# Patient Record
Sex: MALE | Race: WHITE | NOT HISPANIC OR LATINO | Employment: OTHER | URBAN - METROPOLITAN AREA
[De-identification: names, ages, dates, MRNs, and addresses within clinical notes are randomized per-mention and may not be internally consistent; named-entity substitution may affect disease eponyms.]

---

## 2017-02-17 ENCOUNTER — IMPORTED ENCOUNTER (OUTPATIENT)
Dept: URBAN - METROPOLITAN AREA CLINIC 43 | Facility: CLINIC | Age: 77
End: 2017-02-17

## 2017-02-17 PROBLEM — H35.363: Noted: 2017-02-17

## 2017-02-17 PROBLEM — H46.8: Noted: 2017-02-17

## 2017-02-17 PROBLEM — H25.13: Noted: 2017-02-17

## 2017-03-24 ENCOUNTER — IMPORTED ENCOUNTER (OUTPATIENT)
Dept: URBAN - METROPOLITAN AREA CLINIC 43 | Facility: CLINIC | Age: 77
End: 2017-03-24

## 2017-03-24 PROBLEM — H25.13: Noted: 2017-03-24

## 2017-04-06 ENCOUNTER — IMPORTED ENCOUNTER (OUTPATIENT)
Dept: URBAN - METROPOLITAN AREA CLINIC 43 | Facility: CLINIC | Age: 77
End: 2017-04-06

## 2017-04-06 PROBLEM — Z96.1: Noted: 2017-04-06

## 2017-04-11 ENCOUNTER — IMPORTED ENCOUNTER (OUTPATIENT)
Dept: URBAN - METROPOLITAN AREA CLINIC 43 | Facility: CLINIC | Age: 77
End: 2017-04-11

## 2017-04-11 PROBLEM — H25.13: Noted: 2017-04-11

## 2017-04-11 PROBLEM — Z96.1: Noted: 2017-04-11

## 2017-04-19 ENCOUNTER — IMPORTED ENCOUNTER (OUTPATIENT)
Dept: URBAN - METROPOLITAN AREA CLINIC 43 | Facility: CLINIC | Age: 77
End: 2017-04-19

## 2017-04-19 PROBLEM — Z96.1: Noted: 2017-04-19

## 2017-04-25 ENCOUNTER — IMPORTED ENCOUNTER (OUTPATIENT)
Dept: URBAN - METROPOLITAN AREA CLINIC 43 | Facility: CLINIC | Age: 77
End: 2017-04-25

## 2017-04-25 PROBLEM — Z96.1: Noted: 2017-04-25

## 2017-05-18 ENCOUNTER — IMPORTED ENCOUNTER (OUTPATIENT)
Dept: URBAN - METROPOLITAN AREA CLINIC 43 | Facility: CLINIC | Age: 77
End: 2017-05-18

## 2017-05-18 PROBLEM — Z96.1: Noted: 2017-05-18

## 2017-05-18 PROBLEM — H35.722: Noted: 2017-05-18

## 2017-05-18 PROBLEM — H35.3132: Noted: 2017-05-18

## 2017-05-23 ENCOUNTER — IMPORTED ENCOUNTER (OUTPATIENT)
Dept: URBAN - METROPOLITAN AREA CLINIC 43 | Facility: CLINIC | Age: 77
End: 2017-05-23

## 2017-05-25 ENCOUNTER — IMPORTED ENCOUNTER (OUTPATIENT)
Dept: URBAN - METROPOLITAN AREA CLINIC 43 | Facility: CLINIC | Age: 77
End: 2017-05-25

## 2017-11-20 ENCOUNTER — IMPORTED ENCOUNTER (OUTPATIENT)
Dept: URBAN - METROPOLITAN AREA CLINIC 43 | Facility: CLINIC | Age: 77
End: 2017-11-20

## 2017-11-20 PROBLEM — H35.3221: Noted: 2017-11-20

## 2017-11-20 PROBLEM — H35.3112: Noted: 2017-11-20

## 2018-07-09 NOTE — PATIENT DISCUSSION
Patient HAS HEADACHE, jaw pain, neck pain, shoulder pain, loss of weight, AND/or loss of appetite to suggest TEMPORAL ARTERITIS.

## 2018-07-09 NOTE — PROCEDURE NOTE: CLINICAL
PROCEDURE NOTE: Avastin () OD. Diagnosis: Macular Edema. Prior to injection, risks/benefits/alternatives discussed including infection, loss of vision, hemorrhage, cataract, glaucoma, retinal tears or detachment. The off-label status of Intravitreal Avastin also was reviewed. The patient wished to proceed with treatment. Topical anesthesia was induced with Alcaine. Additional anesthesia was achieved using drop(s) or injection checked above. a drop of Povidone-iodine 5% ophthalmic solution was instilled over the injection site and in the inferior fornix. Betadine prep was performed. Using the syringe provided, Avastin 1.25 mg in 0.05 cc was injected into the vitreous cavity. The needle was passed 3.0 mm posterior to the limbus in pseudophakic patients, and 3.5 mm posterior to the lumbus in phakic patients. The remainder of the Avastin in the single-use vial was then discarded in a medical waste disposal container. Unused medication was discarded. Patient tolerated procedure well. There were no complications. Injection time: *. Post-op instructions given. Expiration Date: . EXP. The patient was instructed to return for re-evaluation in approximately 4-12 weeks depending on his/her condition and was told to call immediately if vision decreases and/or if his/her eye becomes red, painful, and/or light sensitive. The patient was instructed to go to the emergency room or call 911 if unable to reach the doctor within an hour or two of trying or calling. The patient was instructed to use Artificial Tears q.i.d. p.r.n for comfort. Peter Delgado

## 2018-07-24 NOTE — PATIENT DISCUSSION
BLOOD WORK FROM 7 17 18 - SED, CRP WNL ,MILD ANEMIA (HGB 10.3, HCT 29.9), ^ Leah Espinoza F/U INTERNIST - NL RPR & FTA, AND BARTONELLA HENSELAE.

## 2018-08-23 NOTE — PATIENT DISCUSSION
BLOOD WORK FROM 7 17 18 - SED, CRP WNL ,MILD ANEMIA (HGB 10.3, HCT 29.9), ^ [place] [employer] F/U INTERNIST - NL RPR & FTA, AND BARTONELLA HENSELAE.

## 2018-10-22 NOTE — PATIENT DISCUSSION
HAS HAD HA FEW DAYS AGO AND SINUS PROBLEMS AND PRESSURE INSIDE THE EYE - DENIES FEVER, WEIGHT LOSS, JAW PAIN ON 10 22 18.

## 2018-10-22 NOTE — PATIENT DISCUSSION
Recommend follow up with patients GENERAL DOCTOR for evaluation of other caused, such as SINUS DISEASE.

## 2018-10-22 NOTE — PATIENT DISCUSSION
BLOOD WORK FROM 7 17 18 - SED, CRP WNL ,MILD ANEMIA (HGB 10.3, HCT 29.9), ^ Edison Vanessa F/U INTERNIST - NL RPR & FTA, AND BARTONELLA HENSELAE.

## 2019-02-08 ENCOUNTER — IMPORTED ENCOUNTER (OUTPATIENT)
Dept: URBAN - METROPOLITAN AREA CLINIC 43 | Facility: CLINIC | Age: 79
End: 2019-02-08

## 2019-02-08 PROBLEM — H35.3221: Noted: 2019-02-08

## 2019-02-08 PROBLEM — H35.3112: Noted: 2019-02-08

## 2019-02-08 PROBLEM — H43.813: Noted: 2019-02-08

## 2019-09-04 NOTE — PATIENT DISCUSSION
BLOOD WORK FROM 7 17 18 - SED, CRP WNL ,MILD ANEMIA (HGB 10.3, HCT 29.9), ^ Mariam Srivastava F/U INTERNIST - NL RPR & FTA, AND BARTONELLA HENSELAE.

## 2019-09-09 ENCOUNTER — IMPORTED ENCOUNTER (OUTPATIENT)
Dept: URBAN - METROPOLITAN AREA CLINIC 43 | Facility: CLINIC | Age: 79
End: 2019-09-09

## 2019-09-23 ENCOUNTER — IMPORTED ENCOUNTER (OUTPATIENT)
Dept: URBAN - METROPOLITAN AREA CLINIC 43 | Facility: CLINIC | Age: 79
End: 2019-09-23

## 2020-04-19 ASSESSMENT — TONOMETRY
OD_IOP_MMHG: 16.0
OS_IOP_MMHG: 14.0
OD_IOP_MMHG: 14.0
OD_IOP_MMHG: 16.0
OS_IOP_MMHG: 14.0
OS_IOP_MMHG: 13.0
OD_IOP_MMHG: 13.0
OS_IOP_MMHG: 14.0
OD_IOP_MMHG: 16.0
OS_IOP_MMHG: 18.0
OD_IOP_MMHG: 15.0
OD_IOP_MMHG: 17.0
OS_IOP_MMHG: 17.0
OD_IOP_MMHG: 13.0
OD_IOP_MMHG: 17.0
OS_IOP_MMHG: 14.0
OS_IOP_MMHG: 14.0

## 2020-04-19 ASSESSMENT — VISUAL ACUITY
OD_CC: 20/30
OD_SC: 20/80
OS_PH: 20/30
OS_SC: 20/40-2
OS_OTHER: <400.
OS_CC: J1-2
OD_SC: 20/20
OS_CC: J1 @15"
OS_SC: 20/20 -3
OD_SC: 20/20
OS_CC: J2
OD_OTHER: 20/200.
OD_SC: 20/30 +2
OS_CC: 20/20-
OS_SC: J3
OS_CC: J2
OD_CC: J1-2
OS_OTHER: <400.
OD_SC: 20/20
OS_SC: J5-2
OD_SC: 20/20
OD_CC: J2
OD_CC: J1+/-
OS_SC: 20/20
OD_CC: 20/50
OS_SC: 20/20 -1
OS_CC: 20/40 +2
OS_SC: 20/100-1
OD_SC: 20/20 -1
OS_SC: 20/20
OD_OTHER: 20/200.
OS_SC: 20/40 +1
OD_PH: 20/50 +2
OS_CC: 20/30
OD_CC: 20/20+
OS_SC: 20/70
OD_CC: J5
OD_SC: 20/20
OS_SC: 20/70
OS_PH: 20/100
OS_SC: 20/30+
OD_SC: J2
OD_CC: 20/40 +2
OD_SC: 20/80
OD_SC: J16
OS_SC: 20/70

## 2020-04-19 ASSESSMENT — KERATOMETRY
OS_K2POWER_DIOPTERS: 42
OS_K2POWER_DIOPTERS: 42
OS_K1POWER_DIOPTERS: 42.25
OD_AXISANGLE2_DEGREES: 25
OD_AXISANGLE2_DEGREES: 90
OS_AXISANGLE_DEGREES: 35
OS_AXISANGLE2_DEGREES: 125
OD_K2POWER_DIOPTERS: 42.75
OD_AXISANGLE_DEGREES: 180
OD_K1POWER_DIOPTERS: 42.75
OS_AXISANGLE_DEGREES: 25
OS_K1POWER_DIOPTERS: 42.25
OD_K2POWER_DIOPTERS: 42.5
OS_AXISANGLE2_DEGREES: 115
OD_AXISANGLE_DEGREES: 115
OD_K1POWER_DIOPTERS: 42.75

## 2020-09-24 NOTE — PATIENT DISCUSSION
9 24 20 REVIEWED OPTION OF 2ND OPINION/CONSULT AT  [facility] [place] TO SEE IF ANYTHING MORE CAN BE DONE - PT DECLINED.

## 2020-09-24 NOTE — PATIENT DISCUSSION
BLOOD WORK FROM 7 17 18 - SED, CRP WNL ,MILD ANEMIA (HGB 10.3, HCT 29.9), ^ Itzel Constantino F/U INTERNIST - NL RPR & FTA, AND BARTONELLA HENSELAE.

## 2021-09-07 ENCOUNTER — ESTABLISHED COMPREHENSIVE EXAM (OUTPATIENT)
Dept: URBAN - METROPOLITAN AREA CLINIC 32 | Facility: CLINIC | Age: 81
End: 2021-09-07

## 2021-09-07 DIAGNOSIS — H35.363: ICD-10-CM

## 2021-09-07 DIAGNOSIS — H04.123: ICD-10-CM

## 2021-09-07 DIAGNOSIS — H35.3131: ICD-10-CM

## 2021-09-07 PROCEDURE — 92014 COMPRE OPH EXAM EST PT 1/>: CPT

## 2021-09-07 PROCEDURE — 92250 FUNDUS PHOTOGRAPHY W/I&R: CPT

## 2021-09-07 ASSESSMENT — VISUAL ACUITY
OS_CC: J1+
OD_SC: 20/25
OD_CC: J1+
OS_SC: 20/25

## 2021-09-07 ASSESSMENT — KERATOMETRY
OD_AXISANGLE2_DEGREES: 85
OS_K2POWER_DIOPTERS: 42.00
OS_AXISANGLE2_DEGREES: 45
OS_AXISANGLE_DEGREES: 135
OD_K1POWER_DIOPTERS: 42.50
OD_AXISANGLE_DEGREES: 175
OS_K1POWER_DIOPTERS: 42.50
OD_K2POWER_DIOPTERS: 42.25

## 2021-09-07 ASSESSMENT — TONOMETRY
OS_IOP_MMHG: 17
OD_IOP_MMHG: 13

## 2021-11-23 ENCOUNTER — EST. PATIENT EMERGENCY (OUTPATIENT)
Dept: URBAN - METROPOLITAN AREA CLINIC 32 | Facility: CLINIC | Age: 81
End: 2021-11-23

## 2021-11-23 DIAGNOSIS — H35.3131: ICD-10-CM

## 2021-11-23 DIAGNOSIS — H35.713: ICD-10-CM

## 2021-11-23 DIAGNOSIS — H04.123: ICD-10-CM

## 2021-11-23 PROCEDURE — 92012 INTRM OPH EXAM EST PATIENT: CPT

## 2021-11-23 PROCEDURE — 92134 CPTRZ OPH DX IMG PST SGM RTA: CPT

## 2021-11-23 ASSESSMENT — VISUAL ACUITY
OD_SC: 20/30-2
OD_CC: J1-2
OS_SC: 20/100-1
OS_CC: J3
OU_CC: J1-2

## 2021-11-23 ASSESSMENT — KERATOMETRY
OS_AXISANGLE2_DEGREES: 45
OS_K1POWER_DIOPTERS: 42.50
OS_AXISANGLE_DEGREES: 135
OD_K1POWER_DIOPTERS: 42.50
OD_K2POWER_DIOPTERS: 42.25
OD_AXISANGLE_DEGREES: 175
OS_K2POWER_DIOPTERS: 42.00
OD_AXISANGLE2_DEGREES: 85

## 2021-11-23 ASSESSMENT — TONOMETRY
OD_IOP_MMHG: 20
OS_IOP_MMHG: 18

## 2023-08-29 ENCOUNTER — COMPREHENSIVE EXAM (OUTPATIENT)
Dept: URBAN - METROPOLITAN AREA CLINIC 32 | Facility: CLINIC | Age: 83
End: 2023-08-29

## 2023-08-29 DIAGNOSIS — H35.3131: ICD-10-CM

## 2023-08-29 DIAGNOSIS — H35.371: ICD-10-CM

## 2023-08-29 DIAGNOSIS — Z96.1: ICD-10-CM

## 2023-08-29 DIAGNOSIS — H35.713: ICD-10-CM

## 2023-08-29 DIAGNOSIS — H35.3222: ICD-10-CM

## 2023-08-29 DIAGNOSIS — H53.10: ICD-10-CM

## 2023-08-29 DIAGNOSIS — H16.221: ICD-10-CM

## 2023-08-29 PROCEDURE — 92134 CPTRZ OPH DX IMG PST SGM RTA: CPT

## 2023-08-29 PROCEDURE — 99214 OFFICE O/P EST MOD 30 MIN: CPT

## 2023-08-29 PROCEDURE — 92015 DETERMINE REFRACTIVE STATE: CPT

## 2023-08-29 ASSESSMENT — KERATOMETRY
OS_K1POWER_DIOPTERS: 42.50
OD_AXISANGLE2_DEGREES: 85
OS_AXISANGLE2_DEGREES: 45
OD_AXISANGLE_DEGREES: 175
OS_AXISANGLE_DEGREES: 135
OD_K2POWER_DIOPTERS: 42.25
OS_K2POWER_DIOPTERS: 42.00
OD_K1POWER_DIOPTERS: 42.50

## 2023-08-29 ASSESSMENT — VISUAL ACUITY
OS_GLARE: 20/30
OD_GLARE: 20/60
OD_CC: J1+
OS_SC: 20/25
OD_SC: 20/40
OS_CC: J1+

## 2023-08-29 ASSESSMENT — TONOMETRY
OS_IOP_MMHG: 16
OD_IOP_MMHG: 17

## 2023-09-20 ENCOUNTER — FOLLOW UP (OUTPATIENT)
Dept: URBAN - METROPOLITAN AREA CLINIC 32 | Facility: CLINIC | Age: 83
End: 2023-09-20

## 2023-09-20 DIAGNOSIS — H35.713: ICD-10-CM

## 2023-09-20 DIAGNOSIS — H53.423: ICD-10-CM

## 2023-09-20 DIAGNOSIS — H53.10: ICD-10-CM

## 2023-09-20 PROCEDURE — 99213 OFFICE O/P EST LOW 20 MIN: CPT

## 2023-09-20 PROCEDURE — 92083 EXTENDED VISUAL FIELD XM: CPT

## 2023-09-20 ASSESSMENT — VISUAL ACUITY
OS_SC: 20/25-2
OD_SC: 20/30-2

## 2023-09-20 ASSESSMENT — KERATOMETRY
OD_AXISANGLE_DEGREES: 175
OD_K2POWER_DIOPTERS: 42.25
OS_K1POWER_DIOPTERS: 42.50
OS_AXISANGLE_DEGREES: 135
OD_AXISANGLE2_DEGREES: 85
OS_AXISANGLE2_DEGREES: 45
OD_K1POWER_DIOPTERS: 42.50
OS_K2POWER_DIOPTERS: 42.00

## 2023-09-20 ASSESSMENT — TONOMETRY
OS_IOP_MMHG: 16
OD_IOP_MMHG: 14

## 2023-10-11 ENCOUNTER — FOLLOW UP (OUTPATIENT)
Dept: URBAN - METROPOLITAN AREA CLINIC 32 | Facility: CLINIC | Age: 83
End: 2023-10-11

## 2023-10-11 DIAGNOSIS — H53.10: ICD-10-CM

## 2023-10-11 DIAGNOSIS — H35.713: ICD-10-CM

## 2023-10-11 DIAGNOSIS — H53.423: ICD-10-CM

## 2023-10-11 PROCEDURE — 99213 OFFICE O/P EST LOW 20 MIN: CPT

## 2023-10-11 ASSESSMENT — KERATOMETRY
OD_AXISANGLE_DEGREES: 175
OS_AXISANGLE_DEGREES: 135
OS_K1POWER_DIOPTERS: 42.50
OD_K1POWER_DIOPTERS: 42.50
OS_K2POWER_DIOPTERS: 42.00
OS_AXISANGLE2_DEGREES: 45
OD_AXISANGLE2_DEGREES: 85
OD_K2POWER_DIOPTERS: 42.25

## 2023-10-11 ASSESSMENT — TONOMETRY
OD_IOP_MMHG: 20
OS_IOP_MMHG: 18

## 2023-10-11 ASSESSMENT — VISUAL ACUITY
OD_SC: 20/40+2
OS_SC: 20/25

## 2024-02-07 ENCOUNTER — FOLLOW UP (OUTPATIENT)
Dept: URBAN - METROPOLITAN AREA CLINIC 32 | Facility: CLINIC | Age: 84
End: 2024-02-07

## 2024-02-07 DIAGNOSIS — H53.423: ICD-10-CM

## 2024-02-07 DIAGNOSIS — H57.813: ICD-10-CM

## 2024-02-07 DIAGNOSIS — H35.713: ICD-10-CM

## 2024-02-07 PROCEDURE — 99213 OFFICE O/P EST LOW 20 MIN: CPT

## 2024-02-07 ASSESSMENT — VISUAL ACUITY
OS_SC: J10-1
OS_CC: J1+
OD_SC: J2
OD_PH: 20/30
OS_SC: 20/25-2
OD_CC: J1
OD_SC: 20/40+2

## 2024-02-07 ASSESSMENT — TONOMETRY
OD_IOP_MMHG: 14
OS_IOP_MMHG: 19

## 2024-02-07 ASSESSMENT — KERATOMETRY
OS_AXISANGLE2_DEGREES: 45
OD_K1POWER_DIOPTERS: 42.50
OD_AXISANGLE_DEGREES: 175
OS_AXISANGLE_DEGREES: 135
OD_AXISANGLE2_DEGREES: 85
OD_K2POWER_DIOPTERS: 42.25
OS_K1POWER_DIOPTERS: 42.50
OS_K2POWER_DIOPTERS: 42.00

## 2024-10-23 ENCOUNTER — COMPREHENSIVE EXAM (OUTPATIENT)
Dept: URBAN - METROPOLITAN AREA CLINIC 32 | Facility: CLINIC | Age: 84
End: 2024-10-23

## 2024-10-23 DIAGNOSIS — H16.221: ICD-10-CM

## 2024-10-23 DIAGNOSIS — H35.3112: ICD-10-CM

## 2024-10-23 DIAGNOSIS — H35.713: ICD-10-CM

## 2024-10-23 DIAGNOSIS — H57.813: ICD-10-CM

## 2024-10-23 DIAGNOSIS — H53.423: ICD-10-CM

## 2024-10-23 DIAGNOSIS — H35.371: ICD-10-CM

## 2024-10-23 DIAGNOSIS — H35.3222: ICD-10-CM

## 2024-10-23 PROCEDURE — 99214 OFFICE O/P EST MOD 30 MIN: CPT

## 2024-10-23 PROCEDURE — 92133 CPTRZD OPH DX IMG PST SGM ON: CPT
